# Patient Record
Sex: FEMALE | Race: WHITE | NOT HISPANIC OR LATINO | URBAN - METROPOLITAN AREA
[De-identification: names, ages, dates, MRNs, and addresses within clinical notes are randomized per-mention and may not be internally consistent; named-entity substitution may affect disease eponyms.]

---

## 2024-06-20 ENCOUNTER — HOSPITAL ENCOUNTER (OUTPATIENT)
Dept: RADIOLOGY | Facility: CLINIC | Age: 59
Discharge: HOME | End: 2024-06-20
Payer: COMMERCIAL

## 2024-06-20 ENCOUNTER — OFFICE VISIT (OUTPATIENT)
Dept: ORTHOPEDIC SURGERY | Facility: CLINIC | Age: 59
End: 2024-06-20
Payer: COMMERCIAL

## 2024-06-20 DIAGNOSIS — Z96.653 HISTORY OF BILATERAL KNEE REPLACEMENT: ICD-10-CM

## 2024-06-20 PROCEDURE — 99213 OFFICE O/P EST LOW 20 MIN: CPT | Performed by: ORTHOPAEDIC SURGERY

## 2024-06-20 PROCEDURE — 73560 X-RAY EXAM OF KNEE 1 OR 2: CPT | Mod: 50

## 2024-06-20 RX ORDER — AMOXICILLIN 500 MG/1
2000 CAPSULE ORAL ONCE
Qty: 4 CAPSULE | Refills: 3 | Status: SHIPPED | OUTPATIENT
Start: 2024-06-20 | End: 2024-06-20

## 2024-06-20 ASSESSMENT — PAIN - FUNCTIONAL ASSESSMENT: PAIN_FUNCTIONAL_ASSESSMENT: NO/DENIES PAIN

## 2024-06-20 NOTE — PROGRESS NOTES
Chief complaint I am doing well with my knee replacements    History very pleasant 59-year-old female who works as a at [employer] and is status post right total knee replacement by me in February 2022, and left total knee replacement by me 12 years ago.  Both are doing very well.  She likes to travel enjoys hiking she denies any instability symptoms.  She has not routinely gone to the dentist but she does not have a prescription for antibiotics should she.    Physical examination reveals a well-developed well-nourished female head is atraumatic normocephalic her respirations are unlabored and regular she has no audible wheezing or coughing heart is regular rate and rhythm by peripheral pulses.    Lower extremity reveals the right knee comes out to full extension and flexes to about 120 degrees it is stable anterior posteriorly medial laterally.  There is minimal swelling or tenderness.  The left knee also comes out to full extension and has full flexion.  It is stable as well anterior posteriorly medial laterally.    X-rays were done of both knees which show both knee replacements in good position there is no evidence of loosening or wear and tear on either knee.    Plan activities as tolerated normal working activities follow-up with me in 1 year for repeat x-rays.  We did call antibiotics into [facility] for predentist appointment